# Patient Record
Sex: FEMALE
[De-identification: names, ages, dates, MRNs, and addresses within clinical notes are randomized per-mention and may not be internally consistent; named-entity substitution may affect disease eponyms.]

---

## 2021-10-04 ENCOUNTER — APPOINTMENT (OUTPATIENT)
Dept: VASCULAR SURGERY | Facility: CLINIC | Age: 51
End: 2021-10-04
Payer: COMMERCIAL

## 2021-10-04 ENCOUNTER — NON-APPOINTMENT (OUTPATIENT)
Age: 51
End: 2021-10-04

## 2021-10-04 VITALS — HEART RATE: 83 BPM | DIASTOLIC BLOOD PRESSURE: 77 MMHG | SYSTOLIC BLOOD PRESSURE: 111 MMHG

## 2021-10-04 DIAGNOSIS — Z82.3 FAMILY HISTORY OF STROKE: ICD-10-CM

## 2021-10-04 DIAGNOSIS — M25.472 EFFUSION, LEFT ANKLE: ICD-10-CM

## 2021-10-04 DIAGNOSIS — Z82.5 FAMILY HISTORY OF ASTHMA AND OTHER CHRONIC LOWER RESPIRATORY DISEASES: ICD-10-CM

## 2021-10-04 DIAGNOSIS — I49.3 VENTRICULAR PREMATURE DEPOLARIZATION: ICD-10-CM

## 2021-10-04 DIAGNOSIS — Z78.9 OTHER SPECIFIED HEALTH STATUS: ICD-10-CM

## 2021-10-04 DIAGNOSIS — Z83.3 FAMILY HISTORY OF DIABETES MELLITUS: ICD-10-CM

## 2021-10-04 PROBLEM — Z00.00 ENCOUNTER FOR PREVENTIVE HEALTH EXAMINATION: Status: ACTIVE | Noted: 2021-10-04

## 2021-10-04 PROCEDURE — 99203 OFFICE O/P NEW LOW 30 MIN: CPT

## 2021-10-04 PROCEDURE — 93971 EXTREMITY STUDY: CPT

## 2021-10-04 RX ORDER — METOPROLOL TARTRATE 75 MG/1
TABLET, FILM COATED ORAL
Refills: 0 | Status: ACTIVE | COMMUNITY

## 2021-10-04 NOTE — HISTORY OF PRESENT ILLNESS
[FreeTextEntry1] : 52 yo female referred by Dr. Lundy for left ankle pain and swelling. The patient reports that she developed pain and swelling in her left ankle several years ago. She reports that she feels like there is a tight band around her ankle. She reports that the pain improves with leg elevation. She reports that she has gained weight and is exercising less secondary to the pandemic. She does not wear compression stockings. She denies a history of DVT or SVT.

## 2021-10-04 NOTE — PHYSICAL EXAM
[JVD] : no jugular venous distention  [Normal Breath Sounds] : Normal breath sounds [Normal Rate and Rhythm] : normal rate and rhythm [2+] : left 2+ [Ankle Swelling (On Exam)] : not present [Ankle Swelling On The Left] : of the left ankle [Varicose Veins Of Lower Extremities] : present [Varicose Veins Of The Left Leg] : of the left leg [Ankle Swelling On The Right] : mild [] : of the left leg [Alert] : alert [Oriented to Person] : oriented to person [Oriented to Place] : oriented to place [Oriented to Time] : oriented to time [de-identified] : Awake and Alert [de-identified] : spider veins left leg [de-identified] : No gross motor or sensory deficits [de-identified] : Appropriate

## 2021-10-04 NOTE — CONSULT LETTER
[Dear  ___] : Dear ~LESLYE, [Consult Letter:] : I had the pleasure of evaluating your patient, [unfilled]. [Please see my note below.] : Please see my note below. [Consult Closing:] : Thank you very much for allowing me to participate in the care of this patient.  If you have any questions, please do not hesitate to contact me. [Sincerely,] : Sincerely, [FreeTextEntry2] : Sharri Gonzalez [FreeTextEntry3] : [place]

## 2021-10-04 NOTE — ASSESSMENT
[FreeTextEntry1] : 50 yo female with a chief compliant of edema of her left ankle. She has no edema on physical exam. She underwent a venous duplex of her left leg. She has no DVT or SVT. She has no evidence of venous insufficiency. There is no indication for vascular intervention at this time. I recommended that she consider wearing an ankle compression sleeve daily during the day.\par \par

## 2021-10-04 NOTE — REVIEW OF SYSTEMS
[Fever] : no fever [Chills] : no chills [Lower Ext Edema] : lower extremity edema [Limb Weakness] : no limb weakness [Difficulty Walking] : no difficulty walking

## 2021-10-05 PROBLEM — M25.472 LEFT ANKLE SWELLING: Status: ACTIVE | Noted: 2021-10-04
